# Patient Record
Sex: FEMALE | URBAN - METROPOLITAN AREA
[De-identification: names, ages, dates, MRNs, and addresses within clinical notes are randomized per-mention and may not be internally consistent; named-entity substitution may affect disease eponyms.]

---

## 2024-07-31 ENCOUNTER — APPOINTMENT (RX ONLY)
Dept: URBAN - METROPOLITAN AREA OTHER 11 | Facility: OTHER | Age: 39
Setting detail: DERMATOLOGY
End: 2024-07-31

## 2024-07-31 DIAGNOSIS — L72.0 EPIDERMAL CYST: ICD-10-CM

## 2024-07-31 DIAGNOSIS — D18.0 HEMANGIOMA: ICD-10-CM

## 2024-07-31 DIAGNOSIS — L82.1 OTHER SEBORRHEIC KERATOSIS: ICD-10-CM

## 2024-07-31 DIAGNOSIS — L20.89 OTHER ATOPIC DERMATITIS: ICD-10-CM | Status: INADEQUATELY CONTROLLED

## 2024-07-31 PROBLEM — D18.01 HEMANGIOMA OF SKIN AND SUBCUTANEOUS TISSUE: Status: ACTIVE | Noted: 2024-07-31

## 2024-07-31 PROCEDURE — 99204 OFFICE O/P NEW MOD 45 MIN: CPT

## 2024-07-31 PROCEDURE — ? EASI CALCULATION

## 2024-07-31 PROCEDURE — ? PRESCRIPTION

## 2024-07-31 PROCEDURE — ? TREATMENT REGIMEN

## 2024-07-31 PROCEDURE — ? COUNSELING

## 2024-07-31 RX ORDER — TACROLIMUS 1 MG/G
THIN LAYER OINTMENT TOPICAL QD
Qty: 100 | Refills: 7 | Status: ERX | COMMUNITY
Start: 2024-07-31

## 2024-07-31 RX ORDER — TRIAMCINOLONE ACETONIDE 1 MG/G
THIN LAYER OINTMENT TOPICAL QD
Qty: 80 | Refills: 5 | Status: ERX | COMMUNITY
Start: 2024-07-31

## 2024-07-31 RX ADMIN — TRIAMCINOLONE ACETONIDE THIN LAYER: 1 OINTMENT TOPICAL at 00:00

## 2024-07-31 RX ADMIN — TACROLIMUS THIN LAYER: 1 OINTMENT TOPICAL at 00:00

## 2024-07-31 ASSESSMENT — LOCATION SIMPLE DESCRIPTION DERM
LOCATION SIMPLE: RIGHT ACHILLES SKIN
LOCATION SIMPLE: RIGHT MIDDLE FINGER
LOCATION SIMPLE: LEFT HAND
LOCATION SIMPLE: ABDOMEN
LOCATION SIMPLE: RIGHT UPPER ARM
LOCATION SIMPLE: LEFT ACHILLES SKIN
LOCATION SIMPLE: LEFT FOREARM
LOCATION SIMPLE: RIGHT HAND
LOCATION SIMPLE: LEFT UPPER ARM
LOCATION SIMPLE: RIGHT INFERIOR EYELID

## 2024-07-31 ASSESSMENT — LOCATION ZONE DERM
LOCATION ZONE: LEG
LOCATION ZONE: ARM
LOCATION ZONE: TRUNK
LOCATION ZONE: EYELID
LOCATION ZONE: FINGER
LOCATION ZONE: HAND

## 2024-07-31 ASSESSMENT — LOCATION DETAILED DESCRIPTION DERM
LOCATION DETAILED: RIGHT RADIAL PALM
LOCATION DETAILED: RIGHT ACHILLES SKIN
LOCATION DETAILED: RIGHT LATERAL INFERIOR EYELID
LOCATION DETAILED: LEFT VENTRAL PROXIMAL FOREARM
LOCATION DETAILED: RIGHT LATERAL ABDOMEN
LOCATION DETAILED: LEFT ACHILLES SKIN
LOCATION DETAILED: LEFT RADIAL DORSAL HAND
LOCATION DETAILED: RIGHT RADIAL DORSAL HAND
LOCATION DETAILED: RIGHT ANTECUBITAL SKIN
LOCATION DETAILED: LEFT THENAR EMINENCE
LOCATION DETAILED: RIGHT MIDDLE FINGER PROXIMAL INTERPHALANGEAL JOINT
LOCATION DETAILED: LEFT ANTECUBITAL SKIN

## 2024-07-31 ASSESSMENT — SEVERITY ASSESSMENT 2020: SEVERITY 2020: MODERATE

## 2024-07-31 ASSESSMENT — ITCH NUMERIC RATING SCALE: HOW SEVERE IS YOUR ITCHING?: 7

## 2024-07-31 ASSESSMENT — BSA ECZEMA: % BODY COVERED IN ECZEMA: 8

## 2024-07-31 NOTE — PROCEDURE: TREATMENT REGIMEN
Initiate Treatment: Tacrolimus QD until clear then TIW as maintenance and triamcinolone ointment QD PRN
Detail Level: Simple
Plan: Patient follow up in 1 month and will consider Dupixent if no significant improvement with topical therapy. Discussed starting Opzelura but patient plans on breastfeeding for the next year or two
Otc Regimen: Vaseline HS covered with cotton gloves

## 2024-07-31 NOTE — HPI: SKIN LESION
Is This A New Presentation, Or A Follow-Up?: Skin Lesion
Additional History: Patient also has eczema on both forearms, hands, and feet. The eczema is flaring and very itchy and has been present for years. She states she has steroid creams at home but did not use any due to pregnancy and breastfeeding.

## 2024-07-31 NOTE — PROCEDURE: EASI CALCULATION
Excoriation (Trunk To Groin): None
Body Surface % (Lower Limbs To Buttocks): 1-9 %
Age Of Patient: Greater Than 7 Years Old
Excoriation (Upper Limbs): Mild
Body Surface % (Upper Limbs): 50-69%
Detail Level: Detailed
Lichenification (Upper Limbs): Moderate
Show Score For Individual Areas?: Yes